# Patient Record
(demographics unavailable — no encounter records)

---

## 2024-10-08 NOTE — DISCUSSION/SUMMARY
[FreeTextEntry1] :  Fifteen month old male with febrile illness/ Viral URI with cough. No antibiotics needed. Will send flu panel to lab (for RSV/COVID/FLU). Recommend symptomatic relief only. Use saline and aspirator to clear airway and use cool mist humidifier as needed. Fever reducers for temp >100.4.

## 2024-10-14 NOTE — DISCUSSION/SUMMARY
[FreeTextEntry1] : 15 month old male with right acute otitis media. Complete antibiotic course. Discussed side effects of medication. Can take probiotics as needed. Provide ibuprofen as needed for pain or fever. If no improvement within 48 hours return for re-evaluation. Follow up in 2-3 weeks for tympanometry.

## 2024-10-14 NOTE — HISTORY OF PRESENT ILLNESS
[EENT/Resp Symptoms] : EENT/RESPIRATORY SYMPTOMS [Nasal congestion] : nasal congestion [Runny nose] : runny nose [___ Week(s)] : [unfilled] week(s) [Intermittent] : intermittent [Irritable] : irritable [Consolable] : consolable [Decreased appetite] : decreased appetite [Wet cough] : wet cough [At Night] : at night [Nasal saline] : nasal saline [Nasal suctioning] : nasal suctioning [Fever] : fever [Change in sleep pattern] : change in sleep pattern [Runny Nose] : runny nose [Nasal Congestion] : nasal congestion [Teething] : teething [Cough] : cough [Decreased Appetite] : decreased appetite [Known Exposure to COVID-19] : no known exposure to COVID-19 [Hx of recent COVID-19 infection] : no history of recent COVID-19 infection [Eye Redness] : no eye redness [Eye Discharge] : no eye discharge [Eye Itching] : no eye itching [Ear Tugging] : no ear tugging [Wheezing] : no wheezing [Posttussive emesis] : no posttussive emesis [Vomiting] : no vomiting [Diarrhea] : no diarrhea [Decreased Urine Output] : no decreased urine output [Rash] : no rash [Loss of taste] : no loss of taste [Loss of smell] : no loss of smell

## 2024-10-23 NOTE — DISCUSSION/SUMMARY
[FreeTextEntry1] : 15 month old male with fever most likely due to acute URI vs. recurrence of AOM. Rapid strep negative and will follow-up with throat culture. Will do Flu panel and follow-up with results. Recommend supportive care including antipyretics, fluids, OTC cough/cold medications if age-appropriate, and nasal saline followed by nasal suction. Return if symptoms worsen or persist.  Will send antibiotics to pharmacy in case symptoms are worsening in setting of acute otitis media.

## 2024-10-23 NOTE — PHYSICAL EXAM
[Erythema] : erythema [Erythematous Oropharynx] : erythematous oropharynx [NL] : moves all extremities x4, warm, well perfused x4 [de-identified] : + erythematous papules on the body and truncal region

## 2024-10-23 NOTE — PHYSICAL EXAM
[Erythema] : erythema [Erythematous Oropharynx] : erythematous oropharynx [NL] : moves all extremities x4, warm, well perfused x4 [de-identified] : + erythematous papules on the body and truncal region

## 2024-10-23 NOTE — HISTORY OF PRESENT ILLNESS
[Fever] : FEVER [___ Day(s)] : [unfilled] day(s) [Intermittent] : intermittent [Irritable] : irritable [Consolable] : consolable [Sick Contacts: ___] : sick contacts: [unfilled] [At Night] : at night [Acetaminophen] : acetaminophen [Ibuprofen] : ibuprofen [Change in sleep pattern] : change in sleep pattern [Runny Nose] : runny nose [Nasal Congestion] : nasal congestion [Teething] : teething [Cough] : cough [Rash] : rash [Max Temp: ____] : Max temperature: [unfilled] [Stable] : stable [Known Exposure to COVID-19] : no known exposure to COVID-19 [Hx of recent COVID-19 infection] : no history of recent COVID-19 infection [Eye Redness] : no eye redness [Eye Discharge] : no eye discharge [Ear Tugging] : no ear tugging [Wheezing] : no wheezing [Decreased Appetite] : no decreased appetite [Vomiting] : no vomiting [Diarrhea] : no diarrhea [Decreased Urine Output] : no decreased urine output

## 2024-11-03 NOTE — HISTORY OF PRESENT ILLNESS
[Mother] : mother [Father] : father [Parents] : parents [Cow's milk (Ounces per day ___)] : consumes [unfilled] oz of cow's milk per day [Fruit] : fruit [Vegetables] : vegetables [Meat] : meat [Cereal] : cereal [Eggs] : eggs [Finger Foods] : finger foods [Table food] : table food [___ stools per day] : [unfilled]  stools per day [Seedy] : seedy [Firm] : firm consistency [___ voids per day] : [unfilled] voids per day [Normal] : Normal [Brushing teeth] : Brushing teeth [Sippy cup use] : Sippy cup use [Yes] : Patient goes to dentist yearly [Tap water] : Primary Fluoride Source: Tap water [Playtime] : Playtime [No] : Not at  exposure [Water heater temperature set at <120 degrees F] : Water heater temperature set at <120 degrees F [Car seat in back seat] : Car seat in back seat [Carbon Monoxide Detectors] : Carbon monoxide detectors [Smoke Detectors] : Smoke detectors [Up to date] : Up to date [NO] : No [Exposure to electronic nicotine delivery system] : No exposure to electronic nicotine delivery system [FreeTextEntry7] : 16 month old male presents for well check visit. Has been doing well since the last visit.

## 2024-11-03 NOTE — DISCUSSION/SUMMARY
[Normal Growth] : growth [Normal Development] : development [No Elimination Concerns] : elimination [No Feeding Concerns] : feeding [No Skin Concerns] : skin [Normal Sleep Pattern] : sleep [Communication and Social Development] : communication and social development [Sleep Routines and Issues] : sleep routines and issues [Temper Tantrums and Discipline] : temper tantrums and discipline [Healthy Teeth] : healthy teeth [Safety] : safety [Parent/Guardian] : parent/guardian [Mother] : mother [Father] : father [] : The components of the vaccine(s) to be administered today are listed in the plan of care. The disease(s) for which the vaccine(s) are intended to prevent and the risks have been discussed with the caretaker.  The risks are also included in the appropriate vaccination information statements which have been provided to the patient's caregiver.  The caregiver has given consent to vaccinate. [FreeTextEntry1] : 16 month old male growing and developing well.  Transition to whole cow's milk. Continue table foods, 3 meals with 2-3 snacks per day. Incorporate up to 6 oz of fluorinated water daily in a sippy cup. Brush teeth twice a day with soft toothbrush. Recommend visit to dentist. When in car, keep child in rear-facing car seats until age 2, or until  the maximum height and weight for seat is reached. Put baby to sleep in own crib with no loose or soft bedding. Lower crib mattress. Help baby to maintain consistent daily routines and sleep schedule. Recognize stranger and separation anxiety. Ensure home is safe since baby is increasingly mobile. Be within arm's reach of baby at all times. Use consistent, positive discipline. Avoid screen time. Read aloud to baby.  Immunizations - Received Hib#4 and PCV#4 vaccinations. Tolerated well.   Will follow-up in few months for 18 month old well check visit.

## 2024-11-03 NOTE — DEVELOPMENTAL MILESTONES
[Normal Development] : Normal Development [None] : none [Imitates scribbling] : imitates scribbling [Drinks from cup with little] : drinks from cup with little spilling [Points to ask for something] : points to ask for something or to get help [Uses 3 words other than names] : uses 3 words other than names [Speaks in sounds that seem like] : speaks in sounds that seem like an unknown language [Follows directions that do not] : follows direction that do not include a gesture [Looks when parent says,] : looks when parent says, "Where is...?" [Squats to  objects] : squats to  objects [Crawls up a few steps] : crawls up a few steps [Makes nuvia with crayon] : makes nuvia with julianneyon [Begins to run] : begins to run [Drops object into and takes object] : drops object into and takes object out of container

## 2024-11-03 NOTE — PHYSICAL EXAM
[Alert] : alert [No Acute Distress] : no acute distress [Normocephalic] : normocephalic [Anterior [place] Closed] : anterior fontanelle closed [Red Reflex Bilateral] : red reflex bilateral [PERRL] : PERRL [Normally Placed Ears] : normally placed ears [Auricles Well Formed] : auricles well formed [Clear Tympanic membranes with present light reflex and bony landmarks] : clear tympanic membranes with present light reflex and bony landmarks [No Discharge] : no discharge [Nares Patent] : nares patent [Palate Intact] : palate intact [Uvula Midline] : uvula midline [Supple, full passive range of motion] : supple, full passive range of motion [Symmetric Chest Rise] : symmetric chest rise [Clear to Auscultation Bilaterally] : clear to auscultation bilaterally [Regular Rate and Rhythm] : regular rate and rhythm [S1, S2 present] : S1, S2 present [No Murmurs] : no murmurs [Soft] : soft [NonTender] : non tender [Non Distended] : non distended [Normoactive Bowel Sounds] : normoactive bowel sounds [Angel 1] : Angel 1 [Central Urethral Opening] : central urethral opening [Testicles Descended Bilaterally] : testicles descended bilaterally [No Clavicular Crepitus] : no clavicular crepitus [Negative Waters-Ortalani] : negative Waters-Ortalani [Symmetric Buttocks Creases] : symmetric buttocks creases [No Spinal Dimple] : no spinal dimple [NoTuft of Hair] : no tuft of hair [Cranial Nerves Grossly Intact] : cranial nerves grossly intact [No Rash or Lesions] : no rash or lesions

## 2024-11-03 NOTE — HISTORY OF PRESENT ILLNESS
[Mother] : mother [Father] : father [Parents] : parents [Cow's milk (Ounces per day ___)] : consumes [unfilled] oz of cow's milk per day [Fruit] : fruit [Vegetables] : vegetables [Meat] : meat [Cereal] : cereal [Eggs] : eggs [Finger Foods] : finger foods [Table food] : table food [___ stools per day] : [unfilled]  stools per day [Seedy] : seedy [Firm] : firm consistency [___ voids per day] : [unfilled] voids per day [Normal] : Normal [Sippy cup use] : Sippy cup use [Brushing teeth] : Brushing teeth [Yes] : Patient goes to dentist yearly [Tap water] : Primary Fluoride Source: Tap water [Playtime] : Playtime [No] : Not at  exposure [Water heater temperature set at <120 degrees F] : Water heater temperature set at <120 degrees F [Car seat in back seat] : Car seat in back seat [Carbon Monoxide Detectors] : Carbon monoxide detectors [Smoke Detectors] : Smoke detectors [Up to date] : Up to date [NO] : No [Exposure to electronic nicotine delivery system] : No exposure to electronic nicotine delivery system [FreeTextEntry7] : 16 month old male presents for well check visit. Has been doing well since the last visit.

## 2024-11-03 NOTE — DEVELOPMENTAL MILESTONES
[Normal Development] : Normal Development [None] : none [Imitates scribbling] : imitates scribbling [Drinks from cup with little] : drinks from cup with little spilling [Points to ask for something] : points to ask for something or to get help [Uses 3 words other than names] : uses 3 words other than names [Speaks in sounds that seem like] : speaks in sounds that seem like an unknown language [Follows directions that do not] : follows direction that do not include a gesture [Looks when parent says,] : looks when parent says, "Where is...?" [Squats to  objects] : squats to  objects [Crawls up a few steps] : crawls up a few steps [Begins to run] : begins to run [Makes nuvia with crayon] : makes nuvia with julianneyon [Drops object into and takes object] : drops object into and takes object out of container

## 2024-11-03 NOTE — PHYSICAL EXAM
[Alert] : alert [No Acute Distress] : no acute distress [Normocephalic] : normocephalic [Anterior [place] Closed] : anterior fontanelle closed [Red Reflex Bilateral] : red reflex bilateral [PERRL] : PERRL [Normally Placed Ears] : normally placed ears [Auricles Well Formed] : auricles well formed [Clear Tympanic membranes with present light reflex and bony landmarks] : clear tympanic membranes with present light reflex and bony landmarks [No Discharge] : no discharge [Nares Patent] : nares patent [Palate Intact] : palate intact [Uvula Midline] : uvula midline [Supple, full passive range of motion] : supple, full passive range of motion [Symmetric Chest Rise] : symmetric chest rise [Clear to Auscultation Bilaterally] : clear to auscultation bilaterally [Regular Rate and Rhythm] : regular rate and rhythm [S1, S2 present] : S1, S2 present [No Murmurs] : no murmurs [Soft] : soft [NonTender] : non tender [Non Distended] : non distended [Normoactive Bowel Sounds] : normoactive bowel sounds [Angel 1] : Angel 1 [Central Urethral Opening] : central urethral opening [Testicles Descended Bilaterally] : testicles descended bilaterally [No Clavicular Crepitus] : no clavicular crepitus [Negative Waters-Ortalani] : negative Waters-Ortalani [Symmetric Buttocks Creases] : symmetric buttocks creases [No Spinal Dimple] : no spinal dimple [NoTuft of Hair] : no tuft of hair [Cranial Nerves Grossly Intact] : cranial nerves grossly intact [No Rash or Lesions] : no rash or lesions

## 2024-11-10 NOTE — HISTORY OF PRESENT ILLNESS
[de-identified] : Follow-up Vaccination [FreeTextEntry6] : 16 month old male presents for Influenza vaccination. Has been doing well since the last visit. Has been teething. No fevers. Good PO intake and urine output. Good activity level.

## 2024-11-10 NOTE — HISTORY OF PRESENT ILLNESS
[de-identified] : Follow-up Vaccination [FreeTextEntry6] : 16 month old male presents for Influenza vaccination. Has been doing well since the last visit. Has been teething. No fevers. Good PO intake and urine output. Good activity level.

## 2024-11-10 NOTE — DISCUSSION/SUMMARY
[FreeTextEntry1] : 16 month old male received Influenza vaccination. Tolerated well.   Will return in one month for Influenza#2, and in two months for 18 month old well check visit.  [] : The components of the vaccine(s) to be administered today are listed in the plan of care. The disease(s) for which the vaccine(s) are intended to prevent and the risks have been discussed with the caretaker.  The risks are also included in the appropriate vaccination information statements which have been provided to the patient's caregiver.  The caregiver has given consent to vaccinate.

## 2024-11-12 NOTE — HISTORY OF PRESENT ILLNESS
[Fever] : FEVER [___ Day(s)] : [unfilled] day(s) [Intermittent] : intermittent [Irritable] : irritable [Consolable] : consolable [At Night] : at night [Acetaminophen] : acetaminophen [Ibuprofen] : ibuprofen [Change in sleep pattern] : change in sleep pattern [Runny Nose] : runny nose [Nasal Congestion] : nasal congestion [Cough] : cough [Max Temp: ____] : Max temperature: [unfilled] [Stable] : stable [Known Exposure to COVID-19] : no known exposure to COVID-19 [Hx of recent COVID-19 infection] : no history of recent COVID-19 infection [Eye Redness] : no eye redness [Eye Discharge] : no eye discharge [Ear Tugging] : no ear tugging [Teething] : no teething [Wheezing] : no wheezing [Decreased Appetite] : no decreased appetite [Vomiting] : no vomiting [Diarrhea] : no diarrhea [Decreased Urine Output] : no decreased urine output [Rash] : no rash

## 2024-11-12 NOTE — DISCUSSION/SUMMARY
[FreeTextEntry1] : 16 month old male with acute URI. Rapid flu and rapid COVID negative. Recommend supportive care including antipyretics, fluids, OTC cough/cold medications if age-appropriate, and nasal saline followed by nasal suction. Return if symptoms worsen or persist.

## 2025-03-09 NOTE — HISTORY OF PRESENT ILLNESS
[Fever] : FEVER [___ Day(s)] : [unfilled] day(s) [Max Temp: ____] : Max temperature: [unfilled] [Stable] : stable [Intermittent] : intermittent [Irritable] : irritable [Consolable] : consolable [Sick Contacts: ___] : sick contacts: [unfilled] [At Night] : at night [Acetaminophen] : acetaminophen [Ibuprofen] : ibuprofen [Change in sleep pattern] : change in sleep pattern [Runny Nose] : runny nose [Nasal Congestion] : nasal congestion [Cough] : cough [Decreased Appetite] : decreased appetite [Known Exposure to COVID-19] : no known exposure to COVID-19 [Hx of recent COVID-19 infection] : no history of recent COVID-19 infection [Eye Redness] : no eye redness [Eye Discharge] : no eye discharge [Wheezing] : no wheezing [Teething] : no teething [Diarrhea] : no diarrhea [Decreased Urine Output] : no decreased urine output [Rash] : no rash

## 2025-03-09 NOTE — DISCUSSION/SUMMARY
[FreeTextEntry1] : 20 month old male with acute URI. Rapid flu and Rapid COVID negative. Will follow-up with Flu panel. Recommend supportive care including antipyretics, fluids, OTC cough/cold medications if age-appropriate, and nasal saline followed by nasal suction. Return if symptoms worsen or persist.  Update: Panel positive for Influenza B and RSV. Recommend supportive care including antipyretics, fluids, rest, and nasal saline followed by nasal suction. Discussed risks & benefits of oseltamivir. Deferred from Tamiflu at this time.

## 2025-03-22 NOTE — HISTORY OF PRESENT ILLNESS
[Derm Symptoms] : DERM SYMPTOMS [Rash] : rash [Face] : face [___ Day(s)] : [unfilled] day(s) [Constant] : constant [Recent Illness] : recent illness [New Formula] : no new formula [New Food] : no new food [New Clothing] : no new clothing [New Skin Products] : no new skin products [New Diapers] : no new diapers [Recent Antibiotic Use] : no recent antibiotic use [Hx of recent COVID-19 infection] : no history of recent COVID-19 infection [Sick Contacts: ___] : sick contacts: [unfilled] [Erythematous] : erythematous [Fever] : no fever [Reducted Appetite] : no reduced appetite [URI Symptoms] : URI symptoms [Lip Swelling] : no lip swelling [Vomiting] : no vomiting [Discharge from affected areas] : no discharge from affected areas [Pruritus] : no pruritus [Diarrhea] : no diarrhea [Bleeding from affected areas] : no bleeding from affected areas [FreeTextEntry4] : nothing applied to area [FreeTextEntry6] : no fevers

## 2025-03-22 NOTE — DISCUSSION/SUMMARY
[FreeTextEntry1] : 20 mo old male with likely skin irritation from insect bites  Well appearing, well hydrated, afebrile in office. Clear lungs upon auscultation. Unremarkable oropharynx and otic exam. no other rash  rash is hive like on the same area of body, no signs of pain, no pus, no fevers, no sign of itch  possible urticaria from viral origin or contact derm, but lesions look like small insect bite as some have central puncrtum monitor for resolution  clean area as normal  may apply bacitracin once daily to prevent infection  rtc if worsening ie pain, spread, fevers paretns agree with plan

## 2025-03-22 NOTE — PHYSICAL EXAM
[Acute Distress] : no acute distress [Alert] : alert [Playful] : playful [Normocephalic] : normocephalic [EOMI] : grossly EOMI [Conjuctival Injection] : no conjunctival injection [Clear] : left tympanic membrane clear [Pink Nasal Mucosa] : pink nasal mucosa [Clear Rhinorrhea] : clear rhinorrhea [Erythematous Oropharynx] : nonerythematous oropharynx [Enlarged Tonsils] : tonsils not enlarged [Vesicles] : no vesicles [Supple] : supple [Clear to Auscultation Bilaterally] : clear to auscultation bilaterally [Regular Rate and Rhythm] : regular rate and rhythm [No Abnormal Lymph Nodes Palpated] : no abnormal lymph nodes palpated [Moves All Extremities x 4] : moves all extremities x4 [de-identified] : blanching small erythematous hive like lesions, no blisters, no bruising, no pus, no bleeding, no signs of itch but one lesion on neck scabbed appearing, 3 on L check , 3 on L shoulder neck area

## 2025-03-30 NOTE — HISTORY OF PRESENT ILLNESS
[EENT/Resp Symptoms] : EENT/RESPIRATORY SYMPTOMS [Nasal congestion] : nasal congestion [Runny nose] : runny nose [___ Day(s)] : [unfilled] day(s) [Intermittent] : intermittent [Consolable] : consolable [Decreased appetite] : decreased appetite [Clear rhinorrhea] : clear rhinorrhea [Barking cough] : barking cough [At Night] : at night [Nasal saline] : nasal saline [Nasal suctioning] : nasal suctioning [Change in sleep pattern] : change in sleep pattern [Runny Nose] : runny nose [Nasal Congestion] : nasal congestion [Cough] : cough [Decreased Appetite] : decreased appetite [Known Exposure to COVID-19] : no known exposure to COVID-19 [Hx of recent COVID-19 infection] : no history of recent COVID-19 infection [Fever] : no fever [Eye Redness] : no eye redness [Eye Discharge] : no eye discharge [Eye Itching] : no eye itching [Ear Tugging] : no ear tugging [Teething] : no teething [Wheezing] : no wheezing [Posttussive emesis] : no posttussive emesis [Vomiting] : no vomiting [Diarrhea] : no diarrhea [Decreased Urine Output] : no decreased urine output [Rash] : no rash [Loss of taste] : no loss of taste [Loss of smell] : no loss of smell [de-identified] : Was seen at [facility] few days ago and was treated for croup with dexamethasone. However, per mother, helped for the first day, but cough started to come back in 48-72 hours. Hears slight congestion at the chest as well

## 2025-03-30 NOTE — PHYSICAL EXAM
[Clear Rhinorrhea] : clear rhinorrhea [NL] : warm, clear [FreeTextEntry7] : + barky cough, + slight squeaky wheeze in the right upper chest region

## 2025-03-30 NOTE — DISCUSSION/SUMMARY
[FreeTextEntry1] : 20 month old male with croup. Given one does of prednisolone in the office. Recommend using mist from a humidifier. Allow the child to breathe cool air during the night by opening a window or door. Fever can be treated with an over-the-counter medication such as acetaminophen or ibuprofen. Coughing can be treated with warm, clear fluids to loosen mucus on the vocal cords. Warm water, apple juice, or lemonade is safe for children older than four months. Frozen juice popsicles also can be given. Keep the child's head elevated. If the child's stridor does not improve contact health care provider immediately.  In addition, due to hearing slight squeaky wheeze in the right upper chest, discussed to give one albuterol nebulizer treatment tonight, and then will follow-up in office tomorrow to see if it needs to be continued.

## 2025-03-30 NOTE — HISTORY OF PRESENT ILLNESS
[EENT/Resp Symptoms] : EENT/RESPIRATORY SYMPTOMS [Nasal congestion] : nasal congestion [Runny nose] : runny nose [___ Day(s)] : [unfilled] day(s) [Intermittent] : intermittent [Consolable] : consolable [Decreased appetite] : decreased appetite [Clear rhinorrhea] : clear rhinorrhea [Barking cough] : barking cough [At Night] : at night [Nasal saline] : nasal saline [Nasal suctioning] : nasal suctioning [Change in sleep pattern] : change in sleep pattern [Runny Nose] : runny nose [Nasal Congestion] : nasal congestion [Cough] : cough [Decreased Appetite] : decreased appetite [Known Exposure to COVID-19] : no known exposure to COVID-19 [Hx of recent COVID-19 infection] : no history of recent COVID-19 infection [Fever] : no fever [Eye Redness] : no eye redness [Eye Discharge] : no eye discharge [Eye Itching] : no eye itching [Ear Tugging] : no ear tugging [Teething] : no teething [Wheezing] : no wheezing [Posttussive emesis] : no posttussive emesis [Vomiting] : no vomiting [Diarrhea] : no diarrhea [Decreased Urine Output] : no decreased urine output [Rash] : no rash [Loss of taste] : no loss of taste [Loss of smell] : no loss of smell [de-identified] : Was seen at [facility] few days ago and was treated for croup with dexamethasone. However, per mother, helped for the first day, but cough started to come back in 48-72 hours. Hears slight congestion at the chest as well

## 2025-04-02 NOTE — BIRTH HISTORY
[At ___ Weeks Gestation] : at [unfilled] weeks gestation [ Section] : by  section [FreeTextEntry1] : [id number] [facility]  [FreeTextEntry3] : Mom is a 32 yo  female with T2DM on Metformin. Pregnancy complicated by cHTN superimposed by severe PEC and endometriosis. Infant needed CPAP support in OR.

## 2025-04-02 NOTE — HISTORY OF PRESENT ILLNESS
[Gestational Age: ___] : Gestational Age in Weeks: [unfilled] [No Parental Concerns] : no parental concerns [Finger Food] : finger food [Table Food] : table food [Normal] : normal [Chronological Age: ___] : Chronological Age in Months: [unfilled] [Corrected Age: ___] : Corrected Age: [unfilled] [None] : None [Urology: ___] : Urology: [unfilled] [de-identified] : s/p soft helmet x 2-3 months Started day care since Sept 3 days a week plan and will be in summer camp  [de-identified] : [employer] whole milk  2 cups a day, water out of straw cup [de-identified] : good eater fruits, vegetables, yogurts, chicken, fish

## 2025-04-02 NOTE — SOCIAL HISTORY
[TextEntry] : lives home with both parents Grandparents help with care Mom is a  Father works for [employer]

## 2025-04-02 NOTE — SOCIAL HISTORY
[TextEntry] : lives home with both parents Grandparents help with care Mom is a  Father works for [employer]

## 2025-04-02 NOTE — PLAN
[Adjusted age milestones discussed at length.] : Adjusted age milestones discussed at length. [Adjusted Age growth and feeding parameters discussed at length.] : Adjusted Age growth and feeding parameters discussed at length.  [Parent counseled to eliminate fruit juices and "junk food" from the child's diet.] : Parent counseled to eliminate fruit juices and "junk food" from the child's diet. [Parent counseled to decrease milk intake to 16 ounces daily at one year.] : Parent counseled to decrease milk intake to 16 ounces daily at one year.  [Safety counseling given regarding major safety issues for children this age.] : Safety counseling given regarding major safety issues for children this age. [Baby proofing discussed, socket plugs, cord and cable safety, tablecloth-removal.] : Baby proofing discussed, socket plugs, cord and cable safety, tablecloth-removal. [All medications should be stored in a child proof container out of reach of the child.] : All medications should be stored in a child proof container out of reach of the child.  [Reading daily was encouraged.] : Reading daily was encouraged.  [Parent was counseled regarding AAP recommendations concerning television watching under the age of two.] : Parent was counseled regarding AAP recommendations concerning television watching under the age of two.  [Avoid choking hazards such as peanuts, hot dogs, un-cut grapes, hot dogs, peanut butter, fruits with skins and balloons.] : Avoid choking hazards such as peanuts, hot dogs, un-cut grapes, hot dogs, peanut butter, fruits with skins and balloons.  [Discussed dental hygiene, addressed fluoride needs.] : Discussed dental hygiene, addressed fluoride needs.  [Toilet training discussed at length.] : Toilet training discussed at length.

## 2025-04-02 NOTE — PHYSICAL EXAM
[Crawl] : crawls  [Come to Sit] : comes to sit [Pull to Stand] : pulls to stand [Walk Alone] : walks alone [Unfisted] : unfisted [Manipulates Fingers] : manipulates fingers [Transfer] : transfers objects [Unilateral Reach/Grasp] : unilaterally reaches/grasps  [Mature Pincer] : has mature pincer [Voluntary Release] : voluntary release  [Finger Feeding] : finger feeding  [Cup] : uses a cup [Helps with Dressing] : helps with dressing  [Helps with Undressing] : helps with undressing [Alert To Sounds] : alert to sounds [Soothes When Picked Up] : soothes when picked up  [Social Smile] : has a social smile [Orients To Voice] : orients to voice [Gesture Language] : gestures language [Understands "No"] : understands "No" [1 Step Command with Gesture] : follows 1 step commands with gesture [[place]] : coos [Laughs Aloud] : laughs aloud ["Breanna Alejandre"] : breanna ross [Razzing] : razzing [Babbling] : babbling ["Blaine" Appropriately] : says "Blaine" appropriately ["Mama" Appropriately] : says "Mama" appropriately [1 Word Other Than Ma/Da] : uses 1 word other than ma/da [Responds to Name] : responds to name  [Stranger Anxiety] : stranger anxiety [Anterior Protective] : normal anterior protective [Lateral Protective] : normal lateral protective [Posterior Protective] : normal posterior protective [Normal] : sensation is intact to light touch [Walk Backwards] : walks backwards [Run] : runs [Handedness] : hand preference noted [Spoon] : uses a spoon [Mayes with Fork] : mayes with fork [1 Step Command without Gesture] : follows 1 step commands without gesture [Points To Body Part] : points to body parts  [Vocabulary Of ___ Words] : has a vocabulary of [unfilled] words [Mature Jargoning] : uses mature jargoning [2 Word Phrases] : uses 2 word phrases [Uses 3 Word Sentences] : uses 3 word sentences [Undresses Completely] : does not undress completely [2 Step Commands] : does not follow 2 step commands [Uses Pronouns Appropriately] : uses pronouns inappropriately [de-identified] : can kick balls, working on throwing  [de-identified] : right handed [de-identified] : learning to use utensils, can drink out of straw and sippy cup, likes to engage in pretend play and would initate play with parents, loves to play with his peers  [de-identified] : points to make a request or share interest, starting to follow 2 step commands  [de-identified] : identify all the letters and numbers, knows many colors, says "I see stars," would point and says this to make a request, would answer to what is your name and at times would joke and says I'm mommy/daddy  [de-identified] : no clonus

## 2025-04-02 NOTE — BIRTH HISTORY
[At ___ Weeks Gestation] : at [unfilled] weeks gestation [ Section] : by  section [FreeTextEntry1] : [address] [facility]  [FreeTextEntry3] : Mom is a 34 yo  female with T2DM on Metformin. Pregnancy complicated by cHTN superimposed by severe PEC and endometriosis. Infant needed CPAP support in OR.

## 2025-04-02 NOTE — HISTORY OF PRESENT ILLNESS
[Gestational Age: ___] : Gestational Age in Weeks: [unfilled] [No Parental Concerns] : no parental concerns [Finger Food] : finger food [Table Food] : table food [Normal] : normal [Chronological Age: ___] : Chronological Age in Months: [unfilled] [Corrected Age: ___] : Corrected Age: [unfilled] [None] : None [Urology: ___] : Urology: [unfilled] [de-identified] : s/p soft helmet x 2-3 months Started day care since Sept 3 days a week plan and will be in summer camp  [de-identified] : [employer] whole milk  2 cups a day, water out of straw cup [de-identified] : good eater fruits, vegetables, yogurts, chicken, fish

## 2025-04-02 NOTE — PHYSICAL EXAM
[Crawl] : crawls  [Come to Sit] : comes to sit [Pull to Stand] : pulls to stand [Walk Alone] : walks alone [Unfisted] : unfisted [Manipulates Fingers] : manipulates fingers [Transfer] : transfers objects [Unilateral Reach/Grasp] : unilaterally reaches/grasps  [Mature Pincer] : has mature pincer [Voluntary Release] : voluntary release  [Finger Feeding] : finger feeding  [Cup] : uses a cup [Helps with Dressing] : helps with dressing  [Helps with Undressing] : helps with undressing [Alert To Sounds] : alert to sounds [Soothes When Picked Up] : soothes when picked up  [Social Smile] : has a social smile [Orients To Voice] : orients to voice [Gesture Language] : gestures language [Understands "No"] : understands "No" [1 Step Command with Gesture] : follows 1 step commands with gesture [[place]] : coos [Laughs Aloud] : laughs aloud ["Breanna Alejandre"] : breanna ross [Razzing] : razzing [Babbling] : babbling ["Blaine" Appropriately] : says "Blaine" appropriately ["Mama" Appropriately] : says "Mama" appropriately [1 Word Other Than Ma/Da] : uses 1 word other than ma/da [Responds to Name] : responds to name  [Stranger Anxiety] : stranger anxiety [Anterior Protective] : normal anterior protective [Lateral Protective] : normal lateral protective [Posterior Protective] : normal posterior protective [Normal] : sensation is intact to light touch [Walk Backwards] : walks backwards [Run] : runs [Handedness] : hand preference noted [Spoon] : uses a spoon [Mayes with Fork] : mayes with fork [1 Step Command without Gesture] : follows 1 step commands without gesture [Points To Body Part] : points to body parts  [Vocabulary Of ___ Words] : has a vocabulary of [unfilled] words [Mature Jargoning] : uses mature jargoning [2 Word Phrases] : uses 2 word phrases [Uses 3 Word Sentences] : uses 3 word sentences [Undresses Completely] : does not undress completely [2 Step Commands] : does not follow 2 step commands [Uses Pronouns Appropriately] : uses pronouns inappropriately [de-identified] : can kick balls, working on throwing  [de-identified] : right handed [de-identified] : learning to use utensils, can drink out of straw and sippy cup, likes to engage in pretend play and would initate play with parents, loves to play with his peers  [de-identified] : points to make a request or share interest, starting to follow 2 step commands  [de-identified] : identify all the letters and numbers, knows many colors, says "I see stars," would point and says this to make a request, would answer to what is your name and at times would joke and says I'm mommy/daddy  [de-identified] : no clonus

## 2025-04-02 NOTE — REASON FOR VISIT
[Follow-Up ] : a  follow-up for [Father] : father [Mother] : mother [FreeTextEntry2] : assess for developmental delay secondary to prematurity 34.1 weeks [FreeTextEntry3] : Developmental and behavioral progress is of the utmost importance and involves complex nuance. Monitoring children with developmental and behavioral concerns is essential due to potential lifelong implications of diagnoses.

## 2025-07-04 NOTE — DISCUSSION/SUMMARY
[FreeTextEntry1] : Two year old male with fever x 1 day most likely secondary to acute URI. Will do flu panel (Flu, COVID, RSV) and follow-up with results. Recommend supportive care including antipyretics, fluids, and nasal saline followed by nasal suction. Return if symptoms worsen or persist. Discussed if fever >105 F, or fever > 5 days, call back for further evaluation. Parent/Guardian and patient expressed understanding.  Slight erythema on the right tympanic membrane concerning for possible start of acute otitis media. Discussed with mother to hold off on antibiotics at this time, however, due to holiday weekend, will send oral antibiotics to pharmacy. If symptoms worsening, will start medication. Complete antibiotic course. Provide ibuprofen as needed for pain or fever. If no improvement within 48 hours return for re-evaluation. Follow up in 2-3 weeks for tympanometry.

## 2025-07-04 NOTE — HISTORY OF PRESENT ILLNESS
[Fever] : FEVER [___ Day(s)] : [unfilled] day(s) [Intermittent] : intermittent [Consolable] : consolable [At Night] : at night [Acetaminophen] : acetaminophen [Ibuprofen] : ibuprofen [Change in sleep pattern] : change in sleep pattern [Ear Tugging] : ear tugging [Nasal Congestion] : nasal congestion [Max Temp: ____] : Max temperature: [unfilled] [Stable] : stable [Known Exposure to COVID-19] : no known exposure to COVID-19 [Hx of recent COVID-19 infection] : no history of recent COVID-19 infection [Eye Redness] : no eye redness [Eye Discharge] : no eye discharge [Runny Nose] : no runny nose [Teething] : no teething [Cough] : no cough [Wheezing] : no wheezing [Decreased Appetite] : no decreased appetite [Vomiting] : no vomiting [Diarrhea] : no diarrhea [Decreased Urine Output] : no decreased urine output [Rash] : no rash

## 2025-07-08 NOTE — PHYSICAL EXAM
[Alert] : alert [No Acute Distress] : no acute distress [Normocephalic] : normocephalic [Anterior [place] Closed] : anterior fontanelle closed [Red Reflex Bilateral] : red reflex bilateral [PERRL] : PERRL [Normally Placed Ears] : normally placed ears [Auricles Well Formed] : auricles well formed [Clear Tympanic membranes with present light reflex and bony landmarks] : clear tympanic membranes with present light reflex and bony landmarks [No Discharge] : no discharge [Nares Patent] : nares patent [Palate Intact] : palate intact [Uvula Midline] : uvula midline [Tooth Eruption] : tooth eruption  [Supple, full passive range of motion] : supple, full passive range of motion [Symmetric Chest Rise] : symmetric chest rise [Clear to Auscultation Bilaterally] : clear to auscultation bilaterally [Regular Rate and Rhythm] : regular rate and rhythm [S1, S2 present] : S1, S2 present [No Murmurs] : no murmurs [Soft] : soft [NonTender] : non tender [Non Distended] : non distended [Normoactive Bowel Sounds] : normoactive bowel sounds [No Hepatomegaly] : no hepatomegaly [No Splenomegaly] : no splenomegaly [Angel 1] : Angel 1 [Central Urethral Opening] : central urethral opening [Testicles Descended Bilaterally] : testicles descended bilaterally [No Clavicular Crepitus] : no clavicular crepitus [Symmetric Buttocks Creases] : symmetric buttocks creases [No Spinal Dimple] : no spinal dimple [NoTuft of Hair] : no tuft of hair [Cranial Nerves Grossly Intact] : cranial nerves grossly intact [No Rash or Lesions] : no rash or lesions

## 2025-07-08 NOTE — DISCUSSION/SUMMARY
[Normal Growth] : growth [Normal Development] : development [No Elimination Concerns] : elimination [No Feeding Concerns] : feeding [No Skin Concerns] : skin [Normal Sleep Pattern] : sleep [Assessment of Language Development] : assessment of language development [Temperament and Behavior] : temperament and behavior [Toilet Training] : toilet training [TV Viewing] : tv viewing [Safety] : safety [Parent/Guardian] : parent/guardian [Mother] : mother [FreeTextEntry1] : 24 month old male growing and developing well.  Continue cow's milk. Continue table foods, 3 meals with 2-3 snacks per day. Incorporate fluorinated water daily in a sippy cup. Brush teeth twice a day with soft toothbrush. Recommend visit to dentist. When in car, keep child in rear-facing car seats until age 2, or until  the maximum height and weight for seat is reached. Put toddler to sleep in own bed. Help toddler to maintain consistent daily routines and sleep schedule. Toilet training discussed. Ensure home is safe. Use consistent, positive discipline. Read aloud to toddler. Limit screen time to no more than 2 hours per day.  Immunizations - Will return for Hep A vaccination.   Labs - CBC and lead level. Will follow-up with results.   Will follow-up in few months for 30 month old well check visit.

## 2025-07-08 NOTE — HISTORY OF PRESENT ILLNESS
[Mother] : mother [Cow's milk (Ounces per day ___)] : consumes [unfilled] oz of Cow's milk per day [Fruit] : fruit [Vegetables] : vegetables [Meat] : meat [Cereal] : cereal [Eggs] : eggs [Finger Foods] : finger foods [Table food] : table food [___ stools per day] : [unfilled]  stools per day [Firm] : stools are firm consistency [___ voids per day] : [unfilled] voids per day [Normal] : Normal [Sippy cup use] : Sippy cup use [Brushing teeth] : Brushing teeth [Yes] : Patient goes to dentist yearly [Tap water] : Primary Fluoride Source: Tap water [In nursery school] : In nursery school [Playtime 60 min a day] : Playtime 60 min a day [Toilet Training] : Toilet training [<2 hrs of screen time] : Less than 2 hrs of screen time [No] : Not at  exposure [Water heater temperature set at <120 degrees F] : Water heater temperature set at <120 degrees F [Car seat in back seat] : Car seat in back seat [Smoke Detectors] : Smoke detectors [Carbon Monoxide Detectors] : Carbon monoxide detectors [Exposure to electronic nicotine delivery system] : No exposure to electronic nicotine delivery system [At risk for exposure to TB] : Not at risk for exposure to Tuberculosis [Up to date] : Up to date [FreeTextEntry7] : Two year old male presents for well check visit. Has been doing well since the last visit.  [de-identified] : (1) Finishing oral antibiotics at this time for acute otitis media. Doing better.  [NO] : No